# Patient Record
Sex: FEMALE | Race: WHITE | NOT HISPANIC OR LATINO | URBAN - METROPOLITAN AREA
[De-identification: names, ages, dates, MRNs, and addresses within clinical notes are randomized per-mention and may not be internally consistent; named-entity substitution may affect disease eponyms.]

---

## 2017-01-10 ENCOUNTER — COMPLETE SKIN EXAM (OUTPATIENT)
Dept: URBAN - METROPOLITAN AREA CLINIC 15 | Facility: CLINIC | Age: 52
Setting detail: DERMATOLOGY
End: 2017-01-10

## 2017-01-10 DIAGNOSIS — L72.8 OTHER FOLLICULAR CYSTS OF THE SKIN AND SUBCUTANEOUS TISSUE: ICD-10-CM

## 2017-01-10 DIAGNOSIS — Z85.828 PERSONAL HISTORY OF OTHER MALIGNANT NEOPLASM OF SKIN: ICD-10-CM

## 2017-01-10 PROBLEM — L570 702.0: Status: ACTIVE | Noted: 2017-01-10

## 2017-01-10 PROBLEM — D485 238.2: Status: ACTIVE | Noted: 2017-01-10

## 2017-01-10 PROBLEM — D235 216.5: Status: ACTIVE | Noted: 2017-01-10

## 2017-01-10 PROCEDURE — 99214 OFFICE O/P EST MOD 30 MIN: CPT

## 2017-01-10 PROCEDURE — 17000 DESTRUCT PREMALG LESION: CPT

## 2017-01-10 PROCEDURE — 17003 DESTRUCT PREMALG LES 2-14: CPT

## 2018-02-20 ENCOUNTER — OTHER- (OUTPATIENT)
Dept: URBAN - METROPOLITAN AREA CLINIC 15 | Facility: CLINIC | Age: 53
Setting detail: DERMATOLOGY
End: 2018-02-20

## 2018-02-20 DIAGNOSIS — D04.4 CARCINOMA IN SITU OF SKIN OF SCALP AND NECK: ICD-10-CM

## 2018-02-20 PROBLEM — L570 702.0: Status: ACTIVE | Noted: 2018-02-20

## 2018-02-20 PROBLEM — D485 238.2: Status: ACTIVE | Noted: 2018-02-20

## 2018-02-20 PROCEDURE — 99214 OFFICE O/P EST MOD 30 MIN: CPT

## 2018-02-20 PROCEDURE — 17000 DESTRUCT PREMALG LESION: CPT

## 2018-02-20 PROCEDURE — 17003 DESTRUCT PREMALG LES 2-14: CPT

## 2018-02-20 PROCEDURE — 11100 BX SKIN SUBCUTANEOUS&/MUCOUS MEMBRANE 1 LESION: CPT

## 2018-02-28 ENCOUNTER — OTHER- (OUTPATIENT)
Dept: URBAN - METROPOLITAN AREA CLINIC 15 | Facility: CLINIC | Age: 53
Setting detail: DERMATOLOGY
End: 2018-02-28

## 2018-02-28 DIAGNOSIS — L57.0 ACTINIC KERATOSIS: ICD-10-CM

## 2018-02-28 PROBLEM — L570 702.0: Status: ACTIVE | Noted: 2018-02-28

## 2018-02-28 PROCEDURE — 17000 DESTRUCT PREMALG LESION: CPT

## 2019-02-26 ENCOUNTER — SKIN CHECK (OUTPATIENT)
Dept: URBAN - METROPOLITAN AREA CLINIC 15 | Facility: CLINIC | Age: 54
Setting detail: DERMATOLOGY
End: 2019-02-26

## 2019-02-26 DIAGNOSIS — C44.529 SQUAMOUS CELL CARCINOMA OF SKIN OF OTHER PART OF TRUNK: ICD-10-CM

## 2019-02-26 PROBLEM — L570 702.0: Status: ACTIVE | Noted: 2019-02-26

## 2019-02-26 PROCEDURE — 17000 DESTRUCT PREMALG LESION: CPT

## 2019-02-26 PROCEDURE — 99214 OFFICE O/P EST MOD 30 MIN: CPT

## 2019-02-26 PROCEDURE — 17003 DESTRUCT PREMALG LES 2-14: CPT

## 2019-02-26 PROCEDURE — 11102 TANGNTL BX SKIN SINGLE LES: CPT

## 2019-03-07 ENCOUNTER — OTHER- (OUTPATIENT)
Dept: URBAN - METROPOLITAN AREA CLINIC 15 | Facility: CLINIC | Age: 54
Setting detail: DERMATOLOGY
End: 2019-03-07

## 2019-03-07 DIAGNOSIS — L90.5 SCAR CONDITIONS AND FIBROSIS OF SKIN: ICD-10-CM

## 2019-03-07 PROBLEM — L570 702.0: Status: ACTIVE | Noted: 2019-03-07

## 2019-03-07 PROCEDURE — 17000 DESTRUCT PREMALG LESION: CPT

## 2020-03-03 ENCOUNTER — SKIN CHECK (OUTPATIENT)
Dept: URBAN - METROPOLITAN AREA CLINIC 15 | Facility: CLINIC | Age: 55
Setting detail: DERMATOLOGY
End: 2020-03-03

## 2020-03-03 DIAGNOSIS — L82.1 OTHER SEBORRHEIC KERATOSIS: ICD-10-CM

## 2020-03-03 DIAGNOSIS — Z85.828 PERSONAL HISTORY OF OTHER MALIGNANT NEOPLASM OF SKIN: ICD-10-CM

## 2020-03-03 PROCEDURE — 17000 DESTRUCT PREMALG LESION: CPT

## 2020-03-03 PROCEDURE — 11102 TANGNTL BX SKIN SINGLE LES: CPT

## 2020-03-03 PROCEDURE — 99214 OFFICE O/P EST MOD 30 MIN: CPT

## 2020-03-03 PROCEDURE — 17003 DESTRUCT PREMALG LES 2-14: CPT

## 2020-08-20 RX ORDER — CLOBETASOL PROPIONATE 0.5 MG/G
1 APPLICATION OINTMENT TOPICAL BID
Qty: 60 | Refills: 0
Start: 2020-08-20

## 2021-03-10 ENCOUNTER — SKIN CHECK (OUTPATIENT)
Dept: URBAN - METROPOLITAN AREA CLINIC 15 | Facility: CLINIC | Age: 56
Setting detail: DERMATOLOGY
End: 2021-03-10

## 2021-03-10 DIAGNOSIS — L81.4 OTHER MELANIN HYPERPIGMENTATION: ICD-10-CM

## 2021-03-10 DIAGNOSIS — L64.8 OTHER ANDROGENIC ALOPECIA: ICD-10-CM

## 2021-03-10 DIAGNOSIS — L82.1 OTHER SEBORRHEIC KERATOSIS: ICD-10-CM

## 2021-03-10 DIAGNOSIS — D22.5 MELANOCYTIC NEVI OF TRUNK: ICD-10-CM

## 2021-03-10 DIAGNOSIS — D48.5 NEOPLASM OF UNCERTAIN BEHAVIOR OF SKIN: ICD-10-CM

## 2021-03-10 PROCEDURE — 99213 OFFICE O/P EST LOW 20 MIN: CPT

## 2021-03-10 PROCEDURE — 17003 DESTRUCT PREMALG LES 2-14: CPT

## 2021-03-10 PROCEDURE — 17000 DESTRUCT PREMALG LESION: CPT

## 2021-04-01 ENCOUNTER — RX ONLY (RX ONLY)
Age: 56
End: 2021-04-01

## 2021-04-01 RX ORDER — TRIAMCINOLONE ACETONIDE 1 MG/G
1 AS DIRECTED CREAM TOPICAL TWICE A DAY
Qty: 80 | Refills: 0
Start: 2021-04-01

## 2022-03-14 ENCOUNTER — SKIN CHECK (OUTPATIENT)
Dept: URBAN - METROPOLITAN AREA CLINIC 15 | Facility: CLINIC | Age: 57
Setting detail: DERMATOLOGY
End: 2022-03-14

## 2022-03-14 DIAGNOSIS — H61.009 UNSPECIFIED PERICHONDRITIS OF EXTERNAL EAR, UNSPECIFIED EAR: ICD-10-CM

## 2022-03-14 DIAGNOSIS — K13.0 DISEASES OF LIPS: ICD-10-CM

## 2022-03-14 DIAGNOSIS — L85.3 XEROSIS CUTIS: ICD-10-CM

## 2022-03-14 DIAGNOSIS — B00.89 OTHER HERPESVIRAL INFECTION: ICD-10-CM

## 2022-03-14 PROCEDURE — 99213 OFFICE O/P EST LOW 20 MIN: CPT

## 2022-03-14 PROCEDURE — 11102 TANGNTL BX SKIN SINGLE LES: CPT

## 2022-03-14 PROCEDURE — 17003 DESTRUCT PREMALG LES 2-14: CPT

## 2022-03-14 PROCEDURE — 17000 DESTRUCT PREMALG LESION: CPT

## 2022-05-25 ENCOUNTER — RASH (OUTPATIENT)
Dept: URBAN - METROPOLITAN AREA CLINIC 8 | Facility: CLINIC | Age: 57
Setting detail: DERMATOLOGY
End: 2022-05-25

## 2022-05-25 DIAGNOSIS — D22.5 MELANOCYTIC NEVI OF TRUNK: ICD-10-CM

## 2022-05-25 DIAGNOSIS — L57.8 OTHER SKIN CHANGES DUE TO CHRONIC EXPOSURE TO NONIONIZING RADIATION: ICD-10-CM

## 2022-05-25 PROCEDURE — 11104 PUNCH BX SKIN SINGLE LESION: CPT

## 2022-05-27 ENCOUNTER — RX ONLY (RX ONLY)
Age: 57
End: 2022-05-27

## 2022-05-27 RX ORDER — TRIAMCINOLONE ACETONIDE 1 MG/G
1 AS DIRECTED CREAM TOPICAL TWICE A DAY
Qty: 80 | Refills: 1
Start: 2022-05-27

## 2022-06-01 ENCOUNTER — SUTURE REMOVAL (OUTPATIENT)
Dept: URBAN - METROPOLITAN AREA CLINIC 8 | Facility: CLINIC | Age: 57
Setting detail: DERMATOLOGY
End: 2022-06-01

## 2022-06-01 DIAGNOSIS — D22.5 MELANOCYTIC NEVI OF TRUNK: ICD-10-CM

## 2022-06-01 PROCEDURE — 99024 POSTOP FOLLOW-UP VISIT: CPT

## 2022-06-09 ENCOUNTER — CONSULT (OUTPATIENT)
Dept: URBAN - METROPOLITAN AREA CLINIC 8 | Facility: CLINIC | Age: 57
Setting detail: DERMATOLOGY
End: 2022-06-09

## 2022-06-09 DIAGNOSIS — L82.0 INFLAMED SEBORRHEIC KERATOSIS: ICD-10-CM

## 2022-06-09 PROCEDURE — 99213 OFFICE O/P EST LOW 20 MIN: CPT

## 2022-09-26 ENCOUNTER — APPOINTMENT (OUTPATIENT)
Dept: URBAN - METROPOLITAN AREA CLINIC 216 | Age: 57
Setting detail: DERMATOLOGY
End: 2022-09-27

## 2022-09-26 DIAGNOSIS — L259 CONTACT DERMATITIS AND OTHER ECZEMA, UNSPECIFIED CAUSE: ICD-10-CM

## 2022-09-26 PROBLEM — L23.9 ALLERGIC CONTACT DERMATITIS, UNSPECIFIED CAUSE: Status: ACTIVE | Noted: 2022-09-26

## 2022-09-26 PROCEDURE — OTHER PATCH TESTING: OTHER

## 2022-09-26 PROCEDURE — 95044 PATCH/APPLICATION TESTS: CPT

## 2022-09-26 NOTE — HPI: TESTING (PATCH TESTING)
Have You Had Previous Patch Testing In The Past?: none
Who Is Your Referring Doctor?: Richard Head MD

## 2022-09-26 NOTE — PROCEDURE: PATCH TESTING
Number Of Patches (Maximum Allowable Per Dos By Cms Is 90): 80
Detail Level: None
Consent: Written consent obtained, risks reviewed including but not limited to rash, itching, allergic reaction, post-inflammatory pigmentary changes, systemic rash, remote possibility of anaphylaxis to allergen.
Post-Care Instructions: I reviewed with the patient in detail post-care instructions. Patient should not sweat, pick at, or get the patches wet for 48 hours.

## 2022-09-28 ENCOUNTER — APPOINTMENT (OUTPATIENT)
Dept: URBAN - METROPOLITAN AREA CLINIC 216 | Age: 57
Setting detail: DERMATOLOGY
End: 2022-10-01

## 2022-09-28 DIAGNOSIS — L259 CONTACT DERMATITIS AND OTHER ECZEMA, UNSPECIFIED CAUSE: ICD-10-CM

## 2022-09-28 PROBLEM — L23.9 ALLERGIC CONTACT DERMATITIS, UNSPECIFIED CAUSE: Status: ACTIVE | Noted: 2022-09-28

## 2022-09-28 PROCEDURE — 99212 OFFICE O/P EST SF 10 MIN: CPT

## 2022-09-28 PROCEDURE — OTHER PATCH TEST REMOVAL: OTHER

## 2022-09-28 PROCEDURE — OTHER ACDS EXTENDED PATCH TEST READING: OTHER

## 2022-09-28 NOTE — PROCEDURE: ACDS EXTENDED PATCH TEST READING
Name Of Allergen 28: gold sodium thiosulfate 2% pet
Allergen 73 Reaction: no reaction
Name Of Allergen 37: propylene glycol 30%
Name Of Allergen 4: potassium dichromate 0.25% pet
Name Of Allergen 43: 2-hydroxyethyl methacrylate 2% pet
Name Of Allergen 22: Mercapto mix 1% pet
Name Of Allergen 13: d-exgk-qrjmwevklxs formaldehyde resin 1.0% pet
Name Of Allergen 50: ethyl acrylate 0.1% pet
Name Of Allergen 53: propolis 10% pet
Name Of Allergen 71: triamcinolone 1% pet
Name Of Allergen 47: lavender absolute 2% pet
Show Negative Results In The Note?: Yes
Name Of Allergen 48: cinnamic aldehyde 1.0% pet
Name Of Allergen 17: methylchloroisothiazolinone/methylisothiazolinone 100ppm aq
Name Of Allergen 60: benzalkonium chloride 0.1% pet
Name Of Allergen 18: quaternium 15 2% pet
Name Of Allergen 27: tixocortol-21-pivalate 1.0%
Name Of Allergen 69: 4 chloro 3 creosote 1% pet
Name Of Allergen 77: benzocaine acid 5% pet
Name Of Allergen 1: nickel sulfate 2.5% pet
Name Of Allergen 52: chlorhexidine gluconate 0.5% aq
Name Of Allergen 6: fragrance mix I 8.0% pet
Name Of Allergen 26: benzocaine 5.0% pet
Name Of Allergen 68: 1,3 diphenylguanidine (DGP)
Name Of Allergen 30: budesonide 0.1% pet
Name Of Allergen 33: bacitracin 20% pet
Name Of Allergen 16: black rubber mix 0.6% pet
Name Of Allergen 39: polymyxin B sulfate 3% pet
Name Of Allergen 29: imidazolidinyl urea 2.0% pet
Name Of Allergen 14: bisphenol A epoxy resin 1.0% pet
Name Of Allergen 59: hydroxyperoxides of limonene 2% pet
Name Of Allergen 40: cocamidopropyl betaine 1.0% aq
Name Of Allergen 44: oleamidopropyl dimethylamine 0.1% aq
Name Of Allergen 34: fragrance mix II 14% pet
Name Of Allergen 46: methyl methacrylate 2.0% pet
Name Of Allergen 19: methyldibromoglutaronitrile 0.5%
Name Of Allergen 75: phenoxyethanol 1% pet
Name Of Allergen 49: D/L alpha tocopherol 100%
Number Of Patches Read: 80
Name Of Allergen 62: sodium benzoate 5% pet
Name Of Allergen 20: p-phenylenediamine 1.0% pet
Name Of Allergen 55: benzophenone-3 10% pet
Name Of Allergen 9: methylisothiazolinone 0.2% aq
Name Of Allergen 2: lanolin alcohol (Americhol 101) 50% pet
Detail Level: Zone
Name Of Allergen 54: 4-chloro-3,5-xylenon (PCMX) 1% pet
What Reading Time Point?: 48 hour
Name Of Allergen 31: hydrocortisone-17-butyrate 1.0% pet
Name Of Allergen 63: sorbic acid 2% pet
Name Of Allergen 5: DMDM hydantoin 1.0% pet
Name Of Allergen 12: cobalt chloride 1% pet
Name Of Allergen 64: sofia black 2% pet
Name Of Allergen 23: 2 bromo-2-nitropropane 1,3 diol 0.5% pet
Name Of Allergen 42: DMAPA 1% aq
Name Of Allergen 36: lidocaine 15% pet
Name Of Allergen 3: neomycin 20% pet
Name Of Allergen 7: colophony 20.0% pet
Name Of Allergen 73: amidoamine 0.1% aq
Name Of Allergen 21: formaldehyde 1.0% aq
Name Of Allergen 72: clobetasol-17-proprionate 1.0%
Name Of Allergen 57: sesquiterpine lactose mix 0.1% pet
Name Of Allergen 70: ethyl hexyl glycerol 5%
Name Of Allergen 41: mixed dialkyl thioureas 1.0% pet
Name Of Allergen 11: ethylenediamine dihydrochloride 1.0%
Name Of Allergen 66: ethyleneurea melamine-formaldehyde 5% pet
Name Of Allergen 61: benzophenone-4 10% pet
Allergen 34 Reaction: +/-
Name Of Allergen 24: thiuram mix 3.0% pet
Name Of Allergen 32: 2-mercaptobenzothiazole 1.0% pet
Name Of Allergen 15: Carba mix 3.0% pet
Name Of Allergen 76: disperse orange 3 1% pet
Name Of Allergen 65: compositae mix 6% pet
Name Of Allergen 35: disperse blue 106/124 mix 1.0% pet
Name Of Allergen 38: iodopropynyl butylcarbamate 0.1% pet
Name Of Allergen 78: butylhydroxytolulene (BHT) 2% pet
Name Of Allergen 74: ethyl cyanoacrylate 10% pet
Name Of Allergen 10: Balsam of [place] 25.0% pet
Name Of Allergen 45: decyl glycoside 5.0% pet
Name Of Allergen 67: sorbitan sesquioleate 20% pet
Name Of Allergen 25: diazolidinyl urea 1.0% pet
Name Of Allergen 79: octinoxate 10% pet
Name Of Allergen 58: cocamide PRAVIN 0.5%
Name Of Allergen 8: paraben mix 12.0% pet
Name Of Allergen 80: benzoyl alcohol 10% pet
Name Of Allergen 56: tosylamide formaldehyde resin 10% pet
Name Of Allergen 51: tea tree oil 5.0% pet

## 2022-09-28 NOTE — PROCEDURE: PATCH TEST REMOVAL
Patch Test Removal Text: The patch test material was removed today. The preliminary patch test reading (48 hour reading) was also performed and any pertinent positives were discussed.  Additionally, the patient was advised to keep the test sites DRY to avoid washing off the marking grid so that positive sites on the final reading can be appropriately identified.
Detail Level: None

## 2022-09-30 ENCOUNTER — APPOINTMENT (OUTPATIENT)
Dept: URBAN - METROPOLITAN AREA CLINIC 216 | Age: 57
Setting detail: DERMATOLOGY
End: 2022-10-02

## 2022-09-30 DIAGNOSIS — L259 CONTACT DERMATITIS AND OTHER ECZEMA, UNSPECIFIED CAUSE: ICD-10-CM

## 2022-09-30 PROBLEM — L23.2 ALLERGIC CONTACT DERMATITIS DUE TO COSMETICS: Status: ACTIVE | Noted: 2022-09-30

## 2022-09-30 PROCEDURE — OTHER CORE ACDS PATCH TEST READING: OTHER

## 2022-09-30 PROCEDURE — OTHER COUNSELING: OTHER

## 2022-09-30 PROCEDURE — 99213 OFFICE O/P EST LOW 20 MIN: CPT

## 2022-09-30 NOTE — PROCEDURE: CORE ACDS PATCH TEST READING
Allergen 74 Reaction: no reaction
Name Of Allergen 14: Bisphenol A epoxy resin 1% pet
Name Of Allergen 84: Disperse yellow 3% pet
Name Of Allergen 9: Methylisothiazolinone 0.2% aq
Name Of Allergen 69: 4-chloro-3-cresol 1% pet
Name Of Allergen 32: 2-Mercaptobenzothiazole 1% pet
Name Of Allergen 17: Methylchloroisothiazolinone/methylisothiazolinone 100ppm aq
Name Of Allergen 1: Nickel sulfate 2.5% pet
Name Of Allergen 62: Sodium benzoate 5% pet
Name Of Allergen 86: Mentha piperita oil 2% (peppermint oil)
Name Of Allergen 31: Hydrocortisone-17-butyrate 1% pet
Name Of Allergen 63: Sorbic acid 2% pet
Name Of Allergen 3: Neomycin 20% pet
Name Of Allergen 7: Colophony 20% pet
Name Of Allergen 29: Imidazolidinyl urea 2% pet
Name Of Allergen 82: Elroy 2.5% pet
Name Of Allergen 13: l-cvsm-hrotzjpeqzl formaldehyde resin 1% pet
Name Of Allergen 40: Cocamidopropyl betaine 1% aq
Number Of Patches Read: 80
Name Of Allergen 50: Ethyl acrylate 0.1% pet
Name Of Allergen 51: Tea tree oil 5% pet
Name Of Allergen 57: Sesquiterpene lactone mix 0.1% pet
Name Of Allergen 88: Shellac 20% ethanol
Name Of Allergen 89: Lauryl glycoside 3% pet
Detail Level: Zone
Name Of Allergen 36: Lidocaine 15% pet
Name Of Allergen 37: Popylene glycol 30%
Name Of Allergen 87: Parmoxine hydrochloride 2% pet
Name Of Allergen 10: Balsam of [place] (Myroxylon pereirae) 25% pet
Name Of Allergen 58: Cocamide PRAVIN 0.5%
Name Of Allergen 73: Amidoamine 0.1% aq
Name Of Allergen 46: Methyl methacyrlate 2% pet
Name Of Allergen 28: Gold sodium thiosulfate 2% pet
Name Of Allergen 60: Benzalkonium chloride 0.1% pet
Name Of Allergen 21: Formaldehhyde 1% aq
Name Of Allergen 70: Ethyl hexyl glycerol 5%
Name Of Allergen 22: Mercapto mix 1% pet
Name Of Allergen 74: Ethyl cyanoacrylate 10% pet
Name Of Allergen 19: Methyldibromoglutaronitrile 0.5%
Name Of Allergen 16: Black rubber mix 0.6% pet
Name Of Allergen 71: Triamcinolone 1% pet
Name Of Allergen 45: Decyl glucoside 5% pet
Name Of Allergen 18: Quaternium-15 2% pet
Name Of Allergen 78: Butylhydroxytolulene (BHT) 2% pet
Name Of Allergen 6: Fragrance mix I 8.0% pet
Allergen 23 Reaction: 1+
Name Of Allergen 47: Lavender absolute 2% pet
Name Of Allergen 76: Disperse orange 3  1% pet
Allergen 53 Reaction: 2+
Name Of Allergen 83: Benzyl salicylate 1% pet
Name Of Allergen 79: 2-ethylhexyl-4-methoxycinnamate 10% pet (octinoxate)
Name Of Allergen 11: Ethylenediamine dihydrochloride 1%
Name Of Allergen 66: Ethylemeurea melamine -formaldehyde 5% pet
Name Of Allergen 80: Benzyl alcohol 10% pet
Name Of Allergen 75: Phenoxyethanol 1% pet
Name Of Allergen 48: Cinnamic aldehyde 1% pet
Name Of Allergen 54: 4-chloro-3,5-xylenon (PCMX) 1% pet
Name Of Allergen 59: Hydroxyperoxides of limonene 2% pet
Name Of Allergen 12: Cobalt chloride 1% pet
Name Of Allergen 77: Benzoic acid 5% pet
Name Of Allergen 65: Compositae mix 6% pet
Name Of Allergen 43: 2-hydroxyethyl methacrylate 2% pet
Allergen 19 Reaction: +/-
Name Of Allergen 61: 2-hydroxy-4-methoxybenzophenone-5-sulfonic acid (benzophenone-4) 10% pet
Name Of Allergen 34: Fragrance mix II 14% pet
Show Allergen Counseling In The Note?: No
Name Of Allergen 72: Clobetasol-17-proprionate 1%
Name Of Allergen 42: 3-(dimethylamino) propylamine (DMAPA) 1% aq
Name Of Allergen 25: Diazolidinyl urea 1% pet
Name Of Allergen 35: Disperse Blue 106/124 mix 1% pet
Name Of Allergen 49: D/L-alpha-tocopherol 100%
Name Of Allergen 5: DMDM Hydantoin 1.0% pet
Name Of Allergen 38: Isopropynyl butylcarbamate 0.1% pet
Name Of Allergen 85: Iveth absolute 2% pet
Name Of Allergen 44: Oleamidopropyl dimethylamine 0.1% aq
Name Of Allergen 81: Cetyl Steryl alcohol 20% pet
Name Of Allergen 15: Carba mix 3% pet
Name Of Allergen 33: Bacitracin 20% pet
Name Of Allergen 30: Budesonide 0.1% pet
What Reading Time Point?: 96 hour
Name Of Allergen 23: 2-Bromo-2-nitropropane 1,3-diol 0.5% pet
Name Of Allergen 41: Mixed dialkyl thioureas 1% pet
Name Of Allergen 4: Potassium dichromate 0.25% pet
Name Of Allergen 27: Tixocortol-21-pivalate 1% pet
Name Of Allergen 64: Diann black 2% pet (Cananga odorata)
Show Negative Results In The Note?: Yes
Name Of Allergen 24: Thiuram mix 3% pet
Name Of Allergen 55: 5-rbjwchc5-ziqjhjhoouisbjmuchv (Benzophenone 3) 10% pet
Name Of Allergen 68: 1,3-diphenylguanidine (DPG)
Name Of Allergen 2: LAnolin alcohol (Amerchol 101) 50% pet
Name Of Allergen 20: p-phenylenediamine 1% pet
Name Of Allergen 39: Polymyxin B sulfate 3% pet
Name Of Allergen 67: Sorbitan sesquioleate 20% pet
Name Of Allergen 26: Benzocaine 5% pet
Name Of Allergen 52: Chlorhexidine digluconate 0.5% aq
Name Of Allergen 8: Paraben mix 12% pet
Name Of Allergen 56: Tosylamide formaldehyde resin 10% pet
Name Of Allergen 53: Propolis 10% pet

## 2022-09-30 NOTE — PROCEDURE: COUNSELING
Detail Level: Zone
Patient Specific Counseling (Will Not Stick From Patient To Patient): Reviewed positive allergens, provided handouts on each allergen, and made safe list of products that are known NOT to contain positives.  Pt may use topical steroids twice daily AS NEEDED for flares but should focus on allergen avoidance.

## 2023-03-31 ENCOUNTER — APPOINTMENT (OUTPATIENT)
Dept: URBAN - METROPOLITAN AREA CLINIC 216 | Age: 58
Setting detail: DERMATOLOGY
End: 2023-04-10

## 2023-03-31 DIAGNOSIS — L82.1 OTHER SEBORRHEIC KERATOSIS: ICD-10-CM

## 2023-03-31 DIAGNOSIS — L259 CONTACT DERMATITIS AND OTHER ECZEMA, UNSPECIFIED CAUSE: ICD-10-CM

## 2023-03-31 DIAGNOSIS — D18.0 HEMANGIOMA: ICD-10-CM

## 2023-03-31 DIAGNOSIS — D22 MELANOCYTIC NEVI: ICD-10-CM

## 2023-03-31 DIAGNOSIS — L81.4 OTHER MELANIN HYPERPIGMENTATION: ICD-10-CM

## 2023-03-31 PROBLEM — D18.01 HEMANGIOMA OF SKIN AND SUBCUTANEOUS TISSUE: Status: ACTIVE | Noted: 2023-03-31

## 2023-03-31 PROBLEM — D22.5 MELANOCYTIC NEVI OF TRUNK: Status: ACTIVE | Noted: 2023-03-31

## 2023-03-31 PROBLEM — L23.2 ALLERGIC CONTACT DERMATITIS DUE TO COSMETICS: Status: ACTIVE | Noted: 2023-03-31

## 2023-03-31 PROCEDURE — 99214 OFFICE O/P EST MOD 30 MIN: CPT

## 2023-03-31 PROCEDURE — OTHER PRESCRIPTION MEDICATION MANAGEMENT: OTHER

## 2023-03-31 PROCEDURE — OTHER COUNSELING: OTHER

## 2023-03-31 PROCEDURE — OTHER PRESCRIPTION: OTHER

## 2023-03-31 RX ORDER — CLOBETASOL PROPIONATE 0.5 MG/ML
SOLUTION TOPICAL
Qty: 50 | Refills: 1 | Status: ERX | COMMUNITY
Start: 2023-03-31

## 2023-03-31 ASSESSMENT — SEVERITY ASSESSMENT: SEVERITY: MILD

## 2023-03-31 ASSESSMENT — LOCATION ZONE DERM
LOCATION ZONE: TRUNK
LOCATION ZONE: NECK
LOCATION ZONE: SCALP

## 2023-03-31 ASSESSMENT — LOCATION SIMPLE DESCRIPTION DERM
LOCATION SIMPLE: POSTERIOR NECK
LOCATION SIMPLE: LEFT UPPER BACK
LOCATION SIMPLE: POSTERIOR SCALP
LOCATION SIMPLE: RIGHT UPPER BACK

## 2023-03-31 ASSESSMENT — LOCATION DETAILED DESCRIPTION DERM
LOCATION DETAILED: RIGHT MID-UPPER BACK
LOCATION DETAILED: MID POSTERIOR NECK
LOCATION DETAILED: LEFT INFERIOR MEDIAL UPPER BACK
LOCATION DETAILED: LEFT MEDIAL UPPER BACK
LOCATION DETAILED: LEFT SUPERIOR UPPER BACK
LOCATION DETAILED: MID-OCCIPITAL SCALP

## 2023-03-31 NOTE — HPI: FULL BODY SKIN EXAMINATION
What Is The Reason For Today's Visit?: Full Body Skin Examination
What Is The Reason For Today's Visit? (Being Monitored For X): the re-examination of lesions previously examined
Left arm;

## 2023-03-31 NOTE — PROCEDURE: PRESCRIPTION MEDICATION MANAGEMENT
Detail Level: Zone
Render In Strict Bullet Format?: No
Initiate Treatment: FF hair care products. Pt provided with Allergy list performed in Sept 2022 and advised to avoid .

## 2024-04-26 ENCOUNTER — APPOINTMENT (OUTPATIENT)
Dept: URBAN - METROPOLITAN AREA CLINIC 216 | Age: 59
Setting detail: DERMATOLOGY
End: 2024-04-27

## 2024-04-26 DIAGNOSIS — L81.4 OTHER MELANIN HYPERPIGMENTATION: ICD-10-CM

## 2024-04-26 DIAGNOSIS — D22 MELANOCYTIC NEVI: ICD-10-CM

## 2024-04-26 DIAGNOSIS — L64.8 OTHER ANDROGENIC ALOPECIA: ICD-10-CM

## 2024-04-26 DIAGNOSIS — L82.1 OTHER SEBORRHEIC KERATOSIS: ICD-10-CM

## 2024-04-26 DIAGNOSIS — D18.0 HEMANGIOMA: ICD-10-CM

## 2024-04-26 PROBLEM — D18.01 HEMANGIOMA OF SKIN AND SUBCUTANEOUS TISSUE: Status: ACTIVE | Noted: 2024-04-26

## 2024-04-26 PROBLEM — D22.5 MELANOCYTIC NEVI OF TRUNK: Status: ACTIVE | Noted: 2024-04-26

## 2024-04-26 PROCEDURE — 99213 OFFICE O/P EST LOW 20 MIN: CPT

## 2024-04-26 PROCEDURE — OTHER PRESCRIPTION MEDICATION MANAGEMENT: OTHER

## 2024-04-26 PROCEDURE — OTHER COUNSELING: OTHER

## 2024-04-26 ASSESSMENT — LOCATION ZONE DERM
LOCATION ZONE: SCALP
LOCATION ZONE: TRUNK

## 2024-04-26 ASSESSMENT — LOCATION DETAILED DESCRIPTION DERM
LOCATION DETAILED: LEFT SUPERIOR UPPER BACK
LOCATION DETAILED: LEFT INFERIOR MEDIAL UPPER BACK
LOCATION DETAILED: POSTERIOR MID-PARIETAL SCALP
LOCATION DETAILED: RIGHT MID-UPPER BACK
LOCATION DETAILED: LEFT MEDIAL UPPER BACK

## 2024-04-26 ASSESSMENT — LOCATION SIMPLE DESCRIPTION DERM
LOCATION SIMPLE: POSTERIOR SCALP
LOCATION SIMPLE: RIGHT UPPER BACK
LOCATION SIMPLE: LEFT UPPER BACK

## 2024-04-26 NOTE — HPI: HAIR LOSS
How Did The Hair Loss Occur?: gradual in onset
Additional History: Patient states she has noticed her hair thinning since menopause. She has had oblation.

## 2024-10-02 ENCOUNTER — APPOINTMENT (OUTPATIENT)
Dept: URBAN - METROPOLITAN AREA CLINIC 215 | Age: 59
Setting detail: DERMATOLOGY
End: 2024-10-03

## 2024-10-02 DIAGNOSIS — L23.7 ALLERGIC CONTACT DERMATITIS DUE TO PLANTS, EXCEPT FOOD: ICD-10-CM

## 2024-10-02 PROCEDURE — OTHER COUNSELING: OTHER

## 2024-10-02 PROCEDURE — 99213 OFFICE O/P EST LOW 20 MIN: CPT

## 2024-10-02 PROCEDURE — OTHER PRESCRIPTION: OTHER

## 2024-10-02 PROCEDURE — OTHER OTC TREATMENT REGIMEN: OTHER

## 2024-10-02 RX ORDER — PREDNISONE 10 MG/1
TABLET ORAL
Qty: 18 | Refills: 0 | Status: ERX | COMMUNITY
Start: 2024-10-02

## 2024-10-02 RX ORDER — CLOBETASOL PROPIONATE 0.5 MG/G
OINTMENT TOPICAL
Qty: 60 | Refills: 2 | Status: ERX | COMMUNITY
Start: 2024-10-02

## 2024-10-02 ASSESSMENT — LOCATION SIMPLE DESCRIPTION DERM
LOCATION SIMPLE: RIGHT UPPER ARM
LOCATION SIMPLE: LEFT UPPER ARM
LOCATION SIMPLE: RIGHT FOREARM

## 2024-10-02 ASSESSMENT — SEVERITY ASSESSMENT 2020: SEVERITY 2020: MODERATE

## 2024-10-02 ASSESSMENT — BSA RASH: BSA RASH: 3

## 2024-10-02 ASSESSMENT — LOCATION DETAILED DESCRIPTION DERM
LOCATION DETAILED: LEFT ANTERIOR LATERAL DISTAL UPPER ARM
LOCATION DETAILED: RIGHT ANTERIOR DISTAL UPPER ARM
LOCATION DETAILED: RIGHT VENTRAL PROXIMAL FOREARM

## 2024-10-02 ASSESSMENT — LOCATION ZONE DERM: LOCATION ZONE: ARM

## 2024-10-02 NOTE — PROCEDURE: OTC TREATMENT REGIMEN
Patient Specific Otc Recommendations (Will Not Stick From Patient To Patient): [place] soaks as directed on package.  Cold compression prn
Detail Level: Simple

## 2025-05-09 ENCOUNTER — APPOINTMENT (OUTPATIENT)
Dept: URBAN - METROPOLITAN AREA CLINIC 216 | Age: 60
Setting detail: DERMATOLOGY
End: 2025-05-11

## 2025-05-09 DIAGNOSIS — D18.0 HEMANGIOMA: ICD-10-CM

## 2025-05-09 DIAGNOSIS — D22 MELANOCYTIC NEVI: ICD-10-CM

## 2025-05-09 DIAGNOSIS — L81.4 OTHER MELANIN HYPERPIGMENTATION: ICD-10-CM

## 2025-05-09 DIAGNOSIS — L57.0 ACTINIC KERATOSIS: ICD-10-CM

## 2025-05-09 DIAGNOSIS — L82.1 OTHER SEBORRHEIC KERATOSIS: ICD-10-CM

## 2025-05-09 PROBLEM — D18.01 HEMANGIOMA OF SKIN AND SUBCUTANEOUS TISSUE: Status: ACTIVE | Noted: 2025-05-09

## 2025-05-09 PROBLEM — D22.5 MELANOCYTIC NEVI OF TRUNK: Status: ACTIVE | Noted: 2025-05-09

## 2025-05-09 PROCEDURE — OTHER LIQUID NITROGEN: OTHER

## 2025-05-09 PROCEDURE — 17003 DESTRUCT PREMALG LES 2-14: CPT

## 2025-05-09 PROCEDURE — 99213 OFFICE O/P EST LOW 20 MIN: CPT | Mod: 25

## 2025-05-09 PROCEDURE — OTHER COUNSELING: OTHER

## 2025-05-09 PROCEDURE — OTHER PRESCRIPTION: OTHER

## 2025-05-09 PROCEDURE — 17000 DESTRUCT PREMALG LESION: CPT

## 2025-05-09 PROCEDURE — OTHER REASSURANCE: OTHER

## 2025-05-09 RX ORDER — TRETIONIN 0.25 MG/G
CREAM TOPICAL
Qty: 20 | Refills: 5 | Status: ERX | COMMUNITY
Start: 2025-05-09

## 2025-05-09 ASSESSMENT — LOCATION SIMPLE DESCRIPTION DERM
LOCATION SIMPLE: LEFT CHEEK
LOCATION SIMPLE: LEFT ANTERIOR NECK
LOCATION SIMPLE: RIGHT UPPER BACK
LOCATION SIMPLE: LEFT UPPER BACK
LOCATION SIMPLE: LEFT FOREHEAD

## 2025-05-09 ASSESSMENT — LOCATION DETAILED DESCRIPTION DERM
LOCATION DETAILED: LEFT INFERIOR CENTRAL MALAR CHEEK
LOCATION DETAILED: RIGHT MID-UPPER BACK
LOCATION DETAILED: LEFT MEDIAL UPPER BACK
LOCATION DETAILED: LEFT FOREHEAD
LOCATION DETAILED: LEFT INFERIOR MEDIAL UPPER BACK
LOCATION DETAILED: LEFT SUPERIOR UPPER BACK
LOCATION DETAILED: LEFT SUPERIOR LATERAL NECK

## 2025-05-09 ASSESSMENT — LOCATION ZONE DERM
LOCATION ZONE: FACE
LOCATION ZONE: NECK
LOCATION ZONE: TRUNK

## 2025-06-30 ENCOUNTER — APPOINTMENT (OUTPATIENT)
Dept: URBAN - METROPOLITAN AREA CLINIC 215 | Age: 60
Setting detail: DERMATOLOGY
End: 2025-07-02

## 2025-06-30 DIAGNOSIS — L81.4 OTHER MELANIN HYPERPIGMENTATION: ICD-10-CM

## 2025-06-30 DIAGNOSIS — L65.0 TELOGEN EFFLUVIUM: ICD-10-CM

## 2025-06-30 PROCEDURE — 99213 OFFICE O/P EST LOW 20 MIN: CPT

## 2025-06-30 PROCEDURE — OTHER PRESCRIPTION: OTHER

## 2025-06-30 PROCEDURE — OTHER COUNSELING: OTHER

## 2025-06-30 PROCEDURE — OTHER PRESCRIPTION MEDICATION MANAGEMENT: OTHER

## 2025-06-30 RX ORDER — TRETIONIN 0.5 MG/G
CREAM TOPICAL
Qty: 20 | Refills: 4 | Status: ERX | COMMUNITY
Start: 2025-06-30

## 2025-06-30 ASSESSMENT — LOCATION SIMPLE DESCRIPTION DERM
LOCATION SIMPLE: POSTERIOR SCALP
LOCATION SIMPLE: POSTERIOR SCALP
LOCATION SIMPLE: LEFT CHEEK
LOCATION SIMPLE: RIGHT CHEEK

## 2025-06-30 ASSESSMENT — LOCATION DETAILED DESCRIPTION DERM
LOCATION DETAILED: RIGHT INFERIOR CENTRAL MALAR CHEEK
LOCATION DETAILED: POSTERIOR MID-PARIETAL SCALP
LOCATION DETAILED: LEFT INFERIOR CENTRAL MALAR CHEEK
LOCATION DETAILED: POSTERIOR MID-PARIETAL SCALP

## 2025-06-30 ASSESSMENT — LOCATION ZONE DERM
LOCATION ZONE: SCALP
LOCATION ZONE: FACE
LOCATION ZONE: SCALP